# Patient Record
Sex: MALE | Race: WHITE | NOT HISPANIC OR LATINO | Employment: FULL TIME | URBAN - NONMETROPOLITAN AREA
[De-identification: names, ages, dates, MRNs, and addresses within clinical notes are randomized per-mention and may not be internally consistent; named-entity substitution may affect disease eponyms.]

---

## 2022-06-09 ENCOUNTER — OFFICE VISIT (OUTPATIENT)
Dept: FAMILY MEDICINE | Facility: CLINIC | Age: 47
End: 2022-06-09
Payer: OTHER GOVERNMENT

## 2022-06-09 VITALS
SYSTOLIC BLOOD PRESSURE: 109 MMHG | OXYGEN SATURATION: 98 % | RESPIRATION RATE: 16 BRPM | TEMPERATURE: 98 F | DIASTOLIC BLOOD PRESSURE: 74 MMHG | BODY MASS INDEX: 25.29 KG/M2 | HEART RATE: 69 BPM | HEIGHT: 70 IN | WEIGHT: 176.63 LBS

## 2022-06-09 DIAGNOSIS — F41.9 ANXIETY: Primary | ICD-10-CM

## 2022-06-09 DIAGNOSIS — K21.9 GASTROESOPHAGEAL REFLUX DISEASE, UNSPECIFIED WHETHER ESOPHAGITIS PRESENT: ICD-10-CM

## 2022-06-09 LAB
ALBUMIN SERPL BCP-MCNC: 4.2 G/DL (ref 3.5–5)
ALBUMIN/GLOB SERPL: 1.3 {RATIO}
ALP SERPL-CCNC: 60 U/L (ref 45–115)
ALT SERPL W P-5'-P-CCNC: 30 U/L (ref 16–61)
ANION GAP SERPL CALCULATED.3IONS-SCNC: 12 MMOL/L (ref 7–16)
AST SERPL W P-5'-P-CCNC: 16 U/L (ref 15–37)
BASOPHILS # BLD AUTO: 0.05 K/UL (ref 0–0.2)
BASOPHILS NFR BLD AUTO: 0.8 % (ref 0–1)
BILIRUB SERPL-MCNC: 0.5 MG/DL (ref 0–1.2)
BUN SERPL-MCNC: 14 MG/DL (ref 7–18)
BUN/CREAT SERPL: 14 (ref 6–20)
CALCIUM SERPL-MCNC: 9.4 MG/DL (ref 8.5–10.1)
CHLORIDE SERPL-SCNC: 107 MMOL/L (ref 98–107)
CO2 SERPL-SCNC: 23 MMOL/L (ref 21–32)
CREAT SERPL-MCNC: 1.01 MG/DL (ref 0.7–1.3)
DIFFERENTIAL METHOD BLD: ABNORMAL
EOSINOPHIL # BLD AUTO: 0.14 K/UL (ref 0–0.5)
EOSINOPHIL NFR BLD AUTO: 2.2 % (ref 1–4)
ERYTHROCYTE [DISTWIDTH] IN BLOOD BY AUTOMATED COUNT: 11.9 % (ref 11.5–14.5)
GLOBULIN SER-MCNC: 3.3 G/DL (ref 2–4)
GLUCOSE SERPL-MCNC: 88 MG/DL (ref 74–106)
HCT VFR BLD AUTO: 43.9 % (ref 40–54)
HGB BLD-MCNC: 15 G/DL (ref 13.5–18)
IMM GRANULOCYTES # BLD AUTO: 0.02 K/UL (ref 0–0.04)
IMM GRANULOCYTES NFR BLD: 0.3 % (ref 0–0.4)
LYMPHOCYTES # BLD AUTO: 1.81 K/UL (ref 1–4.8)
LYMPHOCYTES NFR BLD AUTO: 28.6 % (ref 27–41)
MCH RBC QN AUTO: 31.4 PG (ref 27–31)
MCHC RBC AUTO-ENTMCNC: 34.2 G/DL (ref 32–36)
MCV RBC AUTO: 92 FL (ref 80–96)
MONOCYTES # BLD AUTO: 0.53 K/UL (ref 0–0.8)
MONOCYTES NFR BLD AUTO: 8.4 % (ref 2–6)
MPC BLD CALC-MCNC: 12 FL (ref 9.4–12.4)
NEUTROPHILS # BLD AUTO: 3.77 K/UL (ref 1.8–7.7)
NEUTROPHILS NFR BLD AUTO: 59.7 % (ref 53–65)
NRBC # BLD AUTO: 0 X10E3/UL
NRBC, AUTO (.00): 0 %
PLATELET # BLD AUTO: 241 K/UL (ref 150–400)
POTASSIUM SERPL-SCNC: 4.4 MMOL/L (ref 3.5–5.1)
PROT SERPL-MCNC: 7.5 G/DL (ref 6.4–8.2)
RBC # BLD AUTO: 4.77 M/UL (ref 4.6–6.2)
SODIUM SERPL-SCNC: 138 MMOL/L (ref 136–145)
WBC # BLD AUTO: 6.32 K/UL (ref 4.5–11)

## 2022-06-09 PROCEDURE — 99214 OFFICE O/P EST MOD 30 MIN: CPT | Mod: ,,, | Performed by: FAMILY MEDICINE

## 2022-06-09 PROCEDURE — 80053 COMPREHEN METABOLIC PANEL: CPT | Mod: ,,, | Performed by: CLINICAL MEDICAL LABORATORY

## 2022-06-09 PROCEDURE — 99214 PR OFFICE/OUTPT VISIT, EST, LEVL IV, 30-39 MIN: ICD-10-PCS | Mod: ,,, | Performed by: FAMILY MEDICINE

## 2022-06-09 PROCEDURE — 85025 COMPLETE CBC W/AUTO DIFF WBC: CPT | Mod: ,,, | Performed by: CLINICAL MEDICAL LABORATORY

## 2022-06-09 PROCEDURE — 80053 COMPREHENSIVE METABOLIC PANEL: ICD-10-PCS | Mod: ,,, | Performed by: CLINICAL MEDICAL LABORATORY

## 2022-06-09 PROCEDURE — 85025 CBC WITH DIFFERENTIAL: ICD-10-PCS | Mod: ,,, | Performed by: CLINICAL MEDICAL LABORATORY

## 2022-06-09 RX ORDER — SERTRALINE HYDROCHLORIDE 25 MG/1
25 TABLET, FILM COATED ORAL DAILY
Qty: 45 TABLET | Refills: 0 | Status: SHIPPED | OUTPATIENT
Start: 2022-06-09 | End: 2022-09-09 | Stop reason: SDUPTHER

## 2022-06-09 RX ORDER — OMEPRAZOLE 20 MG/1
20 CAPSULE, DELAYED RELEASE ORAL DAILY
COMMUNITY
End: 2022-06-09 | Stop reason: SDUPTHER

## 2022-06-09 RX ORDER — SERTRALINE HYDROCHLORIDE 25 MG/1
25 TABLET, FILM COATED ORAL DAILY
COMMUNITY
End: 2022-06-09 | Stop reason: SDUPTHER

## 2022-06-09 RX ORDER — OMEPRAZOLE 20 MG/1
20 CAPSULE, DELAYED RELEASE ORAL DAILY
Qty: 90 CAPSULE | Refills: 1 | Status: SHIPPED | OUTPATIENT
Start: 2022-06-09 | End: 2022-10-17 | Stop reason: SDUPTHER

## 2022-06-09 NOTE — PROGRESS NOTES
Yariel Ryan DO   [facility]  [address]  [place]     PATIENT NAME: Lionel Clements  : 1975  DATE: 22  MRN: [id number]      Billing Provider: Yariel Ryan DO  Level of Service:   Patient PCP Information     Provider PCP Type    Yariel Ryan DO General          Reason for Visit / Chief Complaint: No chief complaint on file.       Update PCP  Update Chief Complaint         History of Present Illness / Problem Focused Workflow     Lionel Clements presents to the clinic for discussion of GERD and anxiety. Has been out of medication for 1 month. Reports intermittent compliance previously. Pt reports GERD 2-3 days per week relieved by PRN omeprazole. Reports divorce after 22yr of marriage. Reports intermittent anxiety and waking up at night in a panic. Denies dyspnea, apnea, daytime fatigue or snoring. No other complaints.       Review of Systems     Review of Systems   Constitutional: Negative.    Eyes: Negative.    Respiratory: Negative.    Cardiovascular: Negative.    Gastrointestinal: Negative.    All other systems reviewed and are negative.       Medical / Social / Family History     Past Medical History:   Diagnosis Date    Anxiety     Indigestion        Past Surgical History:   Procedure Laterality Date    CHOLECYSTECTOMY      HERNIA REPAIR      ULNAR NERVE TRANSPOSITION Right     Dr. Michael Wagoner       Social History    reports that he has never smoked. He has never used smokeless tobacco. He reports that he does not drink alcohol and does not use drugs.    Family History  MrGretel's family history is not on file.    Medications and Allergies     Medications  No outpatient medications have been marked as taking for the 22 encounter (Office Visit) with Yariel Ryan DO.       Allergies  Review of patient's allergies indicates:  No Known Allergies    Physical Examination     Vitals:    22 0845   BP: 109/74   BP Location: Left arm   Patient Position:  "Sitting   Pulse: 69   Resp: 16   Temp: 98.2 °F (36.8 °C)   TempSrc: Oral   SpO2: 98%   Weight: 80.1 kg (176 lb 9.6 oz)   Height: 5' 10" (1.778 m)      Physical Exam  Vitals and nursing note reviewed.   Constitutional:       General: He is not in acute distress.     Appearance: Normal appearance. He is normal weight. He is not ill-appearing, toxic-appearing or diaphoretic.   Neck:      Vascular: No carotid bruit.   Cardiovascular:      Rate and Rhythm: Normal rate and regular rhythm.      Pulses: Normal pulses.      Heart sounds: Normal heart sounds.   Pulmonary:      Effort: Pulmonary effort is normal.      Breath sounds: Normal breath sounds.   Lymphadenopathy:      Cervical: No cervical adenopathy.   Neurological:      Mental Status: He is alert.          Assessment and Plan (including Health Maintenance)      Problem List  Smart Sets  Document Outside HM   :    Plan:   Resume zoloft  Resume omeprazole  Discussed with pt the need for compliance with zoloft and that it can take 6 weeks for full effect and which point dose may need to be adjusted. Pt reports that he will try it and may wish to DC at next visit.   CMP   CBC      Health Maintenance Due   Topic Date Due    Hepatitis C Screening  Never done    Lipid Panel  Never done    COVID-19 Vaccine (1) Never done    HIV Screening  Never done    TETANUS VACCINE  Never done    Colorectal Cancer Screening  Never done       Problem List Items Addressed This Visit    None     Visit Diagnoses     Anxiety    -  Primary    Gastroesophageal reflux disease, unspecified whether esophagitis present            Anxiety    Gastroesophageal reflux disease, unspecified whether esophagitis present       Health Maintenance Topics with due status: Not Due       Topic Last Completion Date    Influenza Vaccine Not Due       Procedures     No future appointments.     No follow-ups on file.       Signature:  Yariel Ryan DO    Date of encounter: 6/9/22    Education " Documentation  No documentation found.  Education Comments  No comments found.       There are no Patient Instructions on file for this visit.

## 2022-09-09 DIAGNOSIS — F41.9 ANXIETY: ICD-10-CM

## 2022-09-09 RX ORDER — SERTRALINE HYDROCHLORIDE 25 MG/1
25 TABLET, FILM COATED ORAL DAILY
Qty: 30 TABLET | Refills: 0 | Status: SHIPPED | OUTPATIENT
Start: 2022-09-09 | End: 2022-10-17 | Stop reason: SDUPTHER

## 2022-09-09 NOTE — TELEPHONE ENCOUNTER
----- Message from Maddison Joy sent at 9/8/2022 12:01 PM CDT -----  Pt called needing a refill on his Zoloft he is a dr. Ryan pt he has not seen another provider here tried to make him an appointment but said that was a waste of his time I tried explaining to him to get his refills he needs to establish care   09/09/2022 call to pt and he is making appt to come but is completely out of Zoloft and needs enough until he can get in to establish care.

## 2022-10-17 ENCOUNTER — OFFICE VISIT (OUTPATIENT)
Dept: FAMILY MEDICINE | Facility: CLINIC | Age: 47
End: 2022-10-17
Payer: OTHER GOVERNMENT

## 2022-10-17 VITALS
SYSTOLIC BLOOD PRESSURE: 122 MMHG | TEMPERATURE: 99 F | DIASTOLIC BLOOD PRESSURE: 82 MMHG | BODY MASS INDEX: 26.77 KG/M2 | RESPIRATION RATE: 18 BRPM | WEIGHT: 187 LBS | OXYGEN SATURATION: 97 % | HEART RATE: 78 BPM | HEIGHT: 70 IN

## 2022-10-17 DIAGNOSIS — Z13.220 ENCOUNTER FOR SCREENING FOR LIPID DISORDER: ICD-10-CM

## 2022-10-17 DIAGNOSIS — F41.9 ANXIETY: Primary | ICD-10-CM

## 2022-10-17 DIAGNOSIS — Z12.11 SCREENING FOR MALIGNANT NEOPLASM OF COLON: ICD-10-CM

## 2022-10-17 DIAGNOSIS — K21.9 GASTROESOPHAGEAL REFLUX DISEASE, UNSPECIFIED WHETHER ESOPHAGITIS PRESENT: ICD-10-CM

## 2022-10-17 PROCEDURE — 99214 PR OFFICE/OUTPT VISIT, EST, LEVL IV, 30-39 MIN: ICD-10-PCS | Mod: ,,, | Performed by: FAMILY MEDICINE

## 2022-10-17 PROCEDURE — 99214 OFFICE O/P EST MOD 30 MIN: CPT | Mod: ,,, | Performed by: FAMILY MEDICINE

## 2022-10-17 RX ORDER — SERTRALINE HYDROCHLORIDE 25 MG/1
25 TABLET, FILM COATED ORAL DAILY
Qty: 90 TABLET | Refills: 1 | Status: SHIPPED | OUTPATIENT
Start: 2022-10-17 | End: 2022-12-01

## 2022-10-17 RX ORDER — OMEPRAZOLE 20 MG/1
20 CAPSULE, DELAYED RELEASE ORAL DAILY
Qty: 90 CAPSULE | Refills: 1 | Status: SHIPPED | OUTPATIENT
Start: 2022-10-17 | End: 2023-01-15

## 2022-10-17 NOTE — PROGRESS NOTES
Felicity Smith MD        PATIENT NAME: Lionel Clements  : 1975  DATE: 10/17/22  MRN: [id number]      Billing Provider: Felicity Smith MD  Level of Service:   Patient PCP Information       Provider PCP Type    Felicity Smith MD General            Reason for Visit / Chief Complaint: Establish Care (And needs refills. Denies any complaints)       History of Present Illness      Lionel Clements presents to the clinic with Establish Care (And needs refills. Denies any complaints)     He is here for medication refills, he began to take sertraline when he was going through a divorce, he is doing fine currently, taking it every other day    He is also taking the protonix every other day, he loves spicy food      Review of Systems     Review of Systems   Constitutional:  Negative for activity change, appetite change, fatigue and fever.   Respiratory:  Negative for shortness of breath.    Allergic/Immunologic: Positive for environmental allergies.   Psychiatric/Behavioral:  Negative for agitation, behavioral problems and suicidal ideas.      Medical / Social / Family History     Past Medical History:   Diagnosis Date    Anxiety     Indigestion        Past Surgical History:   Procedure Laterality Date    CHOLECYSTECTOMY      HERNIA REPAIR      ULNAR NERVE TRANSPOSITION Right     Dr. Michael Wagoner       Social History    reports that he has never smoked. He has never used smokeless tobacco. He reports that he does not drink alcohol and does not use drugs.    Family History  's family history includes Heart disease in his maternal grandfather and paternal grandfather.    Medications and Allergies     Medications  Outpatient Medications Marked as Taking for the 10/17/22 encounter (Office Visit) with Felicity Smith MD   Medication Sig Dispense Refill    [DISCONTINUED] omeprazole (PRILOSEC) 20 MG capsule Take 1 capsule (20 mg total) by mouth once daily. 90 capsule 1    [DISCONTINUED] sertraline (ZOLOFT) 25 MG tablet  "Take 1 tablet (25 mg total) by mouth once daily. 30 tablet 0       Allergies  Review of patient's allergies indicates:  No Known Allergies    Physical Examination   /82 (BP Location: Left arm, Patient Position: Sitting)   Pulse 78   Temp 98.8 °F (37.1 °C) (Oral)   Resp 18   Ht 5' 10" (1.778 m)   Wt 84.8 kg (187 lb)   SpO2 97%   BMI 26.83 kg/m²     Physical Exam  Constitutional:       Appearance: Normal appearance. He is normal weight.   Cardiovascular:      Rate and Rhythm: Normal rate and regular rhythm.   Pulmonary:      Effort: Pulmonary effort is normal.      Breath sounds: Normal breath sounds.   Abdominal:      General: Abdomen is flat. Bowel sounds are normal.   Neurological:      Mental Status: He is alert.   Psychiatric:         Mood and Affect: Mood normal.         Behavior: Behavior normal.       Assessment and Plan (including Health Maintenance)     Plan:         Problem List Items Addressed This Visit          Psychiatric    Anxiety - Primary    Relevant Medications    sertraline (ZOLOFT) 25 MG tablet       GI    Gastroesophageal reflux disease    Relevant Medications    omeprazole (PRILOSEC) 20 MG capsule     Other Visit Diagnoses       Encounter for screening for lipid disorder        Relevant Orders    Lipid Panel    Screening for malignant neoplasm of colon        Relevant Orders    Cologuard Screening (Multitarget Stool DNA)              Follow up in about 6 months (around 4/17/2023).        Signature:  Felicity Smith MD      Date of encounter: 10/17/22    "

## 2022-10-25 LAB — NONINV COLON CA DNA+OCC BLD SCRN STL QL: NORMAL

## 2023-11-09 ENCOUNTER — HOSPITAL ENCOUNTER (OUTPATIENT)
Dept: RADIOLOGY | Facility: HOSPITAL | Age: 48
Discharge: HOME | End: 2023-11-09
Attending: NURSE PRACTITIONER
Payer: OTHER GOVERNMENT

## 2023-11-09 DIAGNOSIS — R10.9 ABDOMINAL PAIN: ICD-10-CM

## 2023-11-09 PROCEDURE — 74176 CT ABD & PELVIS W/O CONTRAST: CPT | Mod: TC

## 2024-10-10 ENCOUNTER — OFFICE VISIT (OUTPATIENT)
Dept: FAMILY MEDICINE | Facility: CLINIC | Age: 49
End: 2024-10-10
Payer: OTHER GOVERNMENT

## 2024-10-10 VITALS
SYSTOLIC BLOOD PRESSURE: 126 MMHG | HEART RATE: 71 BPM | OXYGEN SATURATION: 96 % | DIASTOLIC BLOOD PRESSURE: 83 MMHG | BODY MASS INDEX: 26.92 KG/M2 | RESPIRATION RATE: 17 BRPM | WEIGHT: 188 LBS | HEIGHT: 70 IN | TEMPERATURE: 98 F

## 2024-10-10 DIAGNOSIS — H60.90 OTITIS EXTERNA, UNSPECIFIED CHRONICITY, UNSPECIFIED LATERALITY, UNSPECIFIED TYPE: Primary | ICD-10-CM

## 2024-10-10 RX ORDER — CIPROFLOXACIN HYDROCHLORIDE AND HYDROCORTISONE 2; 10 MG/ML; MG/ML
3 SUSPENSION AURICULAR (OTIC) 2 TIMES DAILY
Qty: 10 ML | Refills: 1 | Status: SHIPPED | OUTPATIENT
Start: 2024-10-10 | End: 2024-10-10

## 2024-10-10 RX ORDER — CIPROFLOXACIN HYDROCHLORIDE AND HYDROCORTISONE 2; 10 MG/ML; MG/ML
3 SUSPENSION AURICULAR (OTIC) 2 TIMES DAILY
Qty: 10 ML | Refills: 0 | Status: SHIPPED | OUTPATIENT
Start: 2024-10-10 | End: 2024-10-10

## 2024-10-10 RX ORDER — CIPROFLOXACIN AND DEXAMETHASONE 3; 1 MG/ML; MG/ML
4 SUSPENSION/ DROPS AURICULAR (OTIC) 2 TIMES DAILY
Qty: 7.5 ML | Refills: 1 | Status: SHIPPED | OUTPATIENT
Start: 2024-10-10 | End: 2024-10-10

## 2024-10-10 RX ORDER — OFLOXACIN 3 MG/ML
SOLUTION/ DROPS OPHTHALMIC
Qty: 10 ML | Refills: 1 | Status: SHIPPED | OUTPATIENT
Start: 2024-10-10

## 2024-10-10 NOTE — PROGRESS NOTES
oRman Yoon DO   [place] [address] [facility] [address]  [place]  [address]      PATIENT NAME: Lionel Clements  : 1975  DATE: 10/10/24  MRN: [id number]      Billing Provider: Roman Yoon DO  Level of Service:   Patient PCP Information       Provider PCP Type    NEEL DAO NP General            Reason for Visit / Chief Complaint: Otalgia (Pt c/o left ear pain and congestion that started last night after getting water in his ear during his shower. Pt reports that he frequently has trouble with this ear if it gets water in it. )         History of Present Illness / Problem Focused Workflow     HPI    HPI as noted.  Patient denies fevers, chills, palpitations, diaphoresis, dizziness and lightheadedness.  Patient denies tinnitus as well as headache and nasal congestion.    Review of Systems     @Review of Systems   Constitutional:  Negative for chills, diaphoresis and fever.   HENT:  Negative for sore throat.    Eyes:  Negative for visual disturbance.   Respiratory:  Negative for cough and shortness of breath.    Cardiovascular:  Negative for chest pain and palpitations.   Gastrointestinal:  Negative for abdominal pain, diarrhea, nausea and vomiting.   Genitourinary:  Negative for dysuria and hematuria.   Musculoskeletal:  Negative for arthralgias and leg pain.   Integumentary:  Negative for rash.   Neurological:  Negative for dizziness, light-headedness, numbness and headaches.       Medical / Social / Family History     Past Medical History:   Diagnosis Date    Anxiety     Indigestion        Past Surgical History:   Procedure Laterality Date    CHOLECYSTECTOMY      HERNIA REPAIR      ULNAR NERVE TRANSPOSITION Right     Dr. Michael Wagoner       Medications and Allergies     Medications  Outpatient Medications Marked as Taking for the 10/10/24 encounter (Office Visit) with Roman Yoon DO   Medication Sig Dispense Refill    omeprazole (PRILOSEC) 20 MG capsule Take 1 capsule (20 mg total) by  mouth once daily. 90 capsule 1    sertraline (ZOLOFT) 25 MG tablet Take 1 tablet (25 mg total) by mouth once daily. 90 tablet 1       Allergies  Review of patient's allergies indicates:  No Known Allergies    Physical Examination     Vitals:    10/10/24 1308   BP: 126/83   Pulse: 71   Resp: 17   Temp: 98 °F (36.7 °C)     Physical Exam  Vitals and nursing note reviewed.   Constitutional:       General: He is not in acute distress.     Appearance: He is not ill-appearing, toxic-appearing or diaphoretic.   HENT:      Head: Normocephalic and atraumatic.      Right Ear: External ear normal.      Left Ear: External ear normal. No decreased hearing noted. Drainage and tenderness present. No laceration or swelling.  No middle ear effusion. There is no impacted cerumen. No foreign body. No mastoid tenderness. No PE tube. No hemotympanum. Tympanic membrane is not injected, scarred, perforated, erythematous, retracted or bulging. Tympanic membrane has normal mobility.      Ears:      Comments: Significant drainage noted in the left ear canal     Nose: Nose normal.      Mouth/Throat:      Pharynx: No oropharyngeal exudate or posterior oropharyngeal erythema.   Eyes:      General: No scleral icterus.        Right eye: No discharge.         Left eye: No discharge.      Extraocular Movements: Extraocular movements intact.   Neck:      Vascular: No carotid bruit.   Cardiovascular:      Rate and Rhythm: Normal rate and regular rhythm.      Pulses: Normal pulses.      Heart sounds: Normal heart sounds. No murmur heard.     No friction rub. No gallop.   Pulmonary:      Effort: Pulmonary effort is normal. No respiratory distress.      Breath sounds: No stridor. No wheezing, rhonchi or rales.   Chest:      Chest wall: No tenderness.   Abdominal:      Palpations: Abdomen is soft.      Tenderness: There is no abdominal tenderness. There is no guarding.   Musculoskeletal:         General: No swelling.      Right lower leg: No edema.       Left lower leg: No edema.   Skin:     General: Skin is warm and dry.   Neurological:      Mental Status: He is alert and oriented to person, place, and time.               Lab Results   Component Value Date    WBC 6.32 06/09/2022    HGB 15.0 06/09/2022    HCT 43.9 06/09/2022    MCV 92.0 06/09/2022     06/09/2022        CMP  Sodium   Date Value Ref Range Status   06/09/2022 138 136 - 145 mmol/L Final     Potassium   Date Value Ref Range Status   06/09/2022 4.4 3.5 - 5.1 mmol/L Final     Chloride   Date Value Ref Range Status   06/09/2022 107 98 - 107 mmol/L Final     CO2   Date Value Ref Range Status   06/09/2022 23 21 - 32 mmol/L Final     Glucose   Date Value Ref Range Status   06/09/2022 88 74 - 106 mg/dL Final     BUN   Date Value Ref Range Status   06/09/2022 14 7 - 18 mg/dL Final     Creatinine   Date Value Ref Range Status   06/09/2022 1.01 0.70 - 1.30 mg/dL Final     Calcium   Date Value Ref Range Status   06/09/2022 9.4 8.5 - 10.1 mg/dL Final     Total Protein   Date Value Ref Range Status   06/09/2022 7.5 6.4 - 8.2 g/dL Final     Albumin   Date Value Ref Range Status   06/09/2022 4.2 3.5 - 5.0 g/dL Final     Bilirubin, Total   Date Value Ref Range Status   06/09/2022 0.5 0.0 - 1.2 mg/dL Final     Alk Phos   Date Value Ref Range Status   06/09/2022 60 45 - 115 U/L Final     AST   Date Value Ref Range Status   06/09/2022 16 15 - 37 U/L Final     ALT   Date Value Ref Range Status   06/09/2022 30 16 - 61 U/L Final     Anion Gap   Date Value Ref Range Status   06/09/2022 12 7 - 16 mmol/L Final     Procedures   Assessment and Plan (including Health Maintenance)   :    Plan:     Problem List Items Addressed This Visit    None  Visit Diagnoses       Otitis externa, unspecified chronicity, unspecified laterality, unspecified type    -  Primary    Relevant Medications    ofloxacin (OCUFLOX) 0.3 % ophthalmic solution        Copious drainage noted to left ear.  Some material removed with curette.  Patient's  insurance does not cover Ciprodex or Cipro HC.  Patient is started on ofloxacin.  Patient counseled at length to follow up this coming Monday to monitor progress.  Patient advised at length to present to the ED should symptoms persist or worsen.  Patient signals understanding and agreement with the plan of care.    Health Maintenance Topics with due status: Not Due       Topic Last Completion Date    Colorectal Cancer Screening 12/07/2023    RSV Vaccine (Age 60+ and Pregnant patients) Not Due       Future Appointments   Date Time Provider Department Center   10/14/2024  8:00 AM Roman Yoon,  [facility]        Health Maintenance Due   Topic Date Due    Hepatitis C Screening  Never done    Lipid Panel  Never done    TETANUS VACCINE  Never done    Hemoglobin A1c (Diabetic Prevention Screening)  Never done    COVID-19 Vaccine (3 - 2024-25 season) 09/01/2024          Signature:  Roman Yoon DO  [facility]  [id number] [place] [address], [place]  [address]    Date of encounter: 10/10/24

## 2024-10-11 ENCOUNTER — TELEPHONE (OUTPATIENT)
Dept: FAMILY MEDICINE | Facility: CLINIC | Age: 49
End: 2024-10-11
Payer: OTHER GOVERNMENT

## 2024-10-11 DIAGNOSIS — H60.90 OTITIS EXTERNA, UNSPECIFIED CHRONICITY, UNSPECIFIED LATERALITY, UNSPECIFIED TYPE: Primary | ICD-10-CM

## 2024-10-11 RX ORDER — HYDROCORTISONE AND ACETIC ACID 20.75; 10.375 MG/ML; MG/ML
3 SOLUTION AURICULAR (OTIC) 3 TIMES DAILY
Qty: 10 ML | Refills: 1 | Status: SHIPPED | OUTPATIENT
Start: 2024-10-11 | End: 2024-10-21

## 2024-10-11 NOTE — TELEPHONE ENCOUNTER
Patient called, was seen in clinic yesterday.  He was able to get the antibiotic drops you sent, but is now requesting drops for pain in left ear.

## 2024-10-14 ENCOUNTER — OFFICE VISIT (OUTPATIENT)
Dept: FAMILY MEDICINE | Facility: CLINIC | Age: 49
End: 2024-10-14
Payer: OTHER GOVERNMENT

## 2024-10-14 VITALS
RESPIRATION RATE: 18 BRPM | DIASTOLIC BLOOD PRESSURE: 72 MMHG | HEIGHT: 70 IN | TEMPERATURE: 99 F | BODY MASS INDEX: 25.97 KG/M2 | HEART RATE: 69 BPM | SYSTOLIC BLOOD PRESSURE: 120 MMHG | WEIGHT: 181.38 LBS | OXYGEN SATURATION: 96 %

## 2024-10-14 DIAGNOSIS — H60.90 OTITIS EXTERNA, UNSPECIFIED CHRONICITY, UNSPECIFIED LATERALITY, UNSPECIFIED TYPE: Primary | ICD-10-CM

## 2024-10-14 PROCEDURE — 99213 OFFICE O/P EST LOW 20 MIN: CPT | Mod: ,,, | Performed by: FAMILY MEDICINE

## 2024-10-14 NOTE — PROGRESS NOTES
Roman Yoon DO   [facility]  [address]  [place]  [address]      PATIENT NAME: Lionel Clements  : 1975  DATE: 10/14/24  MRN: [id number]      Billing Provider: Roman Yoon DO  Level of Service: AL OFFICE/OUTPT VISIT, EST, LEVL III, 20-29 MIN  Patient PCP Information       Provider PCP Type    NEEL DAO NP General            Reason for Visit / Chief Complaint: Otitis Externa (Presents today for follow up on ear infection. Has been using ear drops. States somewhat better but still having the fullness sensation. Denies any drainage. )         History of Present Illness / Problem Focused Workflow     HPI    HPI as noted.  Patient is a 49-year-old male presenting for follow up on otitis externa.  Patient states that pain has improved.  Patient denies fevers, chills, palpitations, diaphoresis, headache, tinnitus, dizziness and lightheadedness.    Review of Systems     @Review of Systems   Constitutional:  Negative for chills, diaphoresis and fever.   HENT:  Negative for sore throat.    Eyes:  Negative for visual disturbance.   Respiratory:  Negative for cough and shortness of breath.    Cardiovascular:  Negative for chest pain and palpitations.   Gastrointestinal:  Negative for abdominal pain, diarrhea, nausea and vomiting.   Genitourinary:  Negative for dysuria and hematuria.   Musculoskeletal:  Negative for arthralgias and leg pain.   Integumentary:  Negative for rash.   Neurological:  Negative for dizziness, light-headedness, numbness and headaches.       Medical / Social / Family History     Past Medical History:   Diagnosis Date    Anxiety     Indigestion        Past Surgical History:   Procedure Laterality Date    CHOLECYSTECTOMY      HERNIA REPAIR      ULNAR NERVE TRANSPOSITION Right     Dr. Michael Wagoner       Medications and Allergies     Medications  Outpatient Medications Marked as Taking for the 10/14/24 encounter (Office Visit) with Roman Yoon DO   Medication  Sig Dispense Refill    ofloxacin (OCUFLOX) 0.3 % ophthalmic solution Place 10 drops in the left ear once daily for 7 days 10 mL 1    omeprazole (PRILOSEC) 20 MG capsule Take 1 capsule (20 mg total) by mouth once daily. 90 capsule 1    sertraline (ZOLOFT) 25 MG tablet Take 1 tablet (25 mg total) by mouth once daily. 90 tablet 1       Allergies  Review of patient's allergies indicates:  No Known Allergies    Physical Examination     Vitals:    10/14/24 0817   BP: 120/72   Pulse: 69   Resp: 18   Temp: 98.9 °F (37.2 °C)     Physical Exam  Vitals and nursing note reviewed.   Constitutional:       General: He is not in acute distress.     Appearance: He is not ill-appearing, toxic-appearing or diaphoretic.   HENT:      Head: Normocephalic and atraumatic.      Right Ear: External ear normal. No decreased hearing noted. No laceration, drainage, swelling or tenderness. No middle ear effusion. There is no impacted cerumen. No foreign body. No mastoid tenderness. No PE tube. No hemotympanum. Tympanic membrane is not injected, scarred, perforated, erythematous, retracted or bulging. Tympanic membrane has normal mobility.      Left Ear: External ear normal. No decreased hearing noted. Drainage, swelling and tenderness present. No laceration.  No middle ear effusion. There is no impacted cerumen. No foreign body. No mastoid tenderness. No PE tube. No hemotympanum.      Ears:      Comments: Some debris and drainage noted in the left ear.  Tenderness noted with introduction of the endoscope.  Tympanic membrane can not be visualized on the left.     Nose: Nose normal.      Mouth/Throat:      Pharynx: No oropharyngeal exudate or posterior oropharyngeal erythema.   Eyes:      General: No scleral icterus.        Right eye: No discharge.         Left eye: No discharge.      Extraocular Movements: Extraocular movements intact.   Neck:      Vascular: No carotid bruit.   Cardiovascular:      Rate and Rhythm: Normal rate and regular rhythm.       Pulses: Normal pulses.      Heart sounds: Normal heart sounds. No murmur heard.     No friction rub. No gallop.   Pulmonary:      Effort: Pulmonary effort is normal. No respiratory distress.      Breath sounds: No stridor. No wheezing, rhonchi or rales.   Chest:      Chest wall: No tenderness.   Abdominal:      Palpations: Abdomen is soft.      Tenderness: There is no abdominal tenderness. There is no guarding.   Musculoskeletal:         General: No swelling.      Right lower leg: No edema.      Left lower leg: No edema.   Skin:     General: Skin is warm and dry.   Neurological:      Mental Status: He is alert and oriented to person, place, and time.               Lab Results   Component Value Date    WBC 6.32 06/09/2022    HGB 15.0 06/09/2022    HCT 43.9 06/09/2022    MCV 92.0 06/09/2022     06/09/2022        CMP  Sodium   Date Value Ref Range Status   06/09/2022 138 136 - 145 mmol/L Final     Potassium   Date Value Ref Range Status   06/09/2022 4.4 3.5 - 5.1 mmol/L Final     Chloride   Date Value Ref Range Status   06/09/2022 107 98 - 107 mmol/L Final     CO2   Date Value Ref Range Status   06/09/2022 23 21 - 32 mmol/L Final     Glucose   Date Value Ref Range Status   06/09/2022 88 74 - 106 mg/dL Final     BUN   Date Value Ref Range Status   06/09/2022 14 7 - 18 mg/dL Final     Creatinine   Date Value Ref Range Status   06/09/2022 1.01 0.70 - 1.30 mg/dL Final     Calcium   Date Value Ref Range Status   06/09/2022 9.4 8.5 - 10.1 mg/dL Final     Total Protein   Date Value Ref Range Status   06/09/2022 7.5 6.4 - 8.2 g/dL Final     Albumin   Date Value Ref Range Status   06/09/2022 4.2 3.5 - 5.0 g/dL Final     Bilirubin, Total   Date Value Ref Range Status   06/09/2022 0.5 0.0 - 1.2 mg/dL Final     Alk Phos   Date Value Ref Range Status   06/09/2022 60 45 - 115 U/L Final     AST   Date Value Ref Range Status   06/09/2022 16 15 - 37 U/L Final     ALT   Date Value Ref Range Status   06/09/2022 30 16 - 61 U/L  Final     Anion Gap   Date Value Ref Range Status   06/09/2022 12 7 - 16 mmol/L Final     Procedures   Assessment and Plan (including Health Maintenance)   :    Plan:     Problem List Items Addressed This Visit    None  Visit Diagnoses       Otitis externa, unspecified chronicity, unspecified laterality, unspecified type    -  Primary        Patient reports that his pain is improved.  Patient advised to use shower cap when showering.  Patient advised to use both fluoroquinolone and VoSol drops.  Patient advised to use an ear wick for introduction of the medicine.  Follow up by the end of the week or sooner if needed.  Patient counseled at length to call, return to clinic or present to the ED should symptoms persist or worsen.  Patient signals understanding and agreement with the plan of care.    Health Maintenance Topics with due status: Not Due       Topic Last Completion Date    Colorectal Cancer Screening 12/07/2023    RSV Vaccine (Age 60+ and Pregnant patients) Not Due       Future Appointments   Date Time Provider Department Center   10/18/2024  2:20 PM Roman Yoon DO [facility]        Health Maintenance Due   Topic Date Due    Hepatitis C Screening  Never done    Lipid Panel  Never done    TETANUS VACCINE  Never done    Hemoglobin A1c (Diabetic Prevention Screening)  Never done    COVID-19 Vaccine (3 - 2024-25 season) 09/01/2024          Signature:  Roman Yoon DO  [facility]  [address] [place] [address], [place]  [address]    Date of encounter: 10/14/24

## 2024-10-14 NOTE — PATIENT INSTRUCTIONS
For sinus drainage, you can use an OTC antihistamine like Zyrtec or Xyzal. You can also use flonase and azelastine.     You can also use a NeilMed Sinus rinse or Neti Pot.